# Patient Record
Sex: FEMALE | Race: WHITE
[De-identification: names, ages, dates, MRNs, and addresses within clinical notes are randomized per-mention and may not be internally consistent; named-entity substitution may affect disease eponyms.]

---

## 2019-08-15 ENCOUNTER — HOSPITAL ENCOUNTER (EMERGENCY)
Dept: HOSPITAL 75 - ER FS | Age: 34
Discharge: HOME | End: 2019-08-15
Payer: COMMERCIAL

## 2019-08-15 VITALS — WEIGHT: 150 LBS | BODY MASS INDEX: 25.61 KG/M2 | HEIGHT: 64 IN

## 2019-08-15 VITALS — DIASTOLIC BLOOD PRESSURE: 75 MMHG | SYSTOLIC BLOOD PRESSURE: 114 MMHG
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
## 2019-08-15 DIAGNOSIS — S90.32XA: Primary | ICD-10-CM

## 2019-08-15 PROCEDURE — 73630 X-RAY EXAM OF FOOT: CPT

## 2019-08-15 NOTE — ED LOWER EXTREMITY
General


Chief Complaint:  Lower Extremity


Stated Complaint:  LT FOOT INJ


Source:  patient


Exam Limitations:  no limitations





History of Present Illness


Date Seen by Provider:  Aug 15, 2019


Time Seen by Provider:  20:15


Initial Comments


The patient is a very pleasant 34-year-old female who presents for evaluation of

a left foot injury. She states that she was helping to move a camper and put her

foot down and ended up hitting the top of her foot on a piece of metal quite 

hard. She has some swelling to the top of the left foot and has significant pain

when trying to fully bear weight. She denies any ankle pain or any other 

complaints. She is alert and oriented 4, calm, and appears to be in no 

distress.


Location Injury Occurred:  home


Onset:  just prior to arrival


Severity:  moderate


Pain/Injury Location:  right foot


Method of Injury:  direct blow


Modifying Factors:  Improves With Rest





Allergies and Home Medications


Patient Home Medication List


Home Medication List Reviewed:  Yes





Review of Systems


Constitutional:  no symptoms reported


EENTM:  no symptoms reported


Respiratory:  no symptoms reported


Cardiovascular:  no symptoms reported


Gastrointestinal:  no symptoms reported


Genitourinary:  no symptoms reported


Musculoskeletal:  other (left foot pain, swelling on top of left foot, ttp over 

2nd, 3rd, and 4th metatarsals)


Skin:  no symptoms reported


Psychiatric/Neurological:  No Symptoms Reported





All Other Systems Reviewed


Negative Unless Noted:  Yes





Past Medical-Social-Family Hx


Past Med/Social Hx:  Reviewed Nursing Past Med/Soc Hx


Patient Social History


Recent Foreign Travel:  No


Contact w/Someone Who Travel:  No





Physical Exam


Vital Signs


Capillary Refill :


Height, Weight, BMI


Height: '"


Weight: lbs. oz. kg;  BMI


Method:


General Appearance:  WD/WN, no apparent distress


HEENT:  PERRL/EOMI, normal ENT inspection


Cardiovascular:  regular rate, rhythm, no edema


Respiratory:  normal breath sounds, no respiratory distress


Feet:  left foot bone tenderness (2nd, 3rd, 4th metatarsals), left foot limited 

range of motion, left foot swelling


Neurologic/Psychiatric:  CNs II-XII nml as tested, alert, normal mood/affect, 

oriented x 3


Skin:  normal color, warm/dry





Progress/Results/Core Measures


Results/Orders


My Orders





Orders - FUNMILAYO PLATA DO


Ice: Apply To Affected Area (8/15/19 20:20)


Foot 3 View Left (8/15/19 20:20)








Progress


Progress Note :  


Progress Note


@2027 - preliminary read of the left foot x-ray film is unremarkable. Patient 

given Ace wrap and crutches. Advised patient to follow up with her PCP and 

return to the emergency Department immediately for new or worsening symptoms. T

he patient is stable for discharge.





Departure


Impression





   Primary Impression:  


   Contusion of left foot


Disposition:  01 HOME, SELF-CARE


Condition:  Stable





Departure-Patient Inst.


Decision time for Depature:  20:35


Referrals:  


CLIFF LEI DO


Patient Instructions:  Contusion (DC)





Add. Discharge Instructions:  


He is a crutches provided. Follow-up with her doctor in the next 2-3 days. Apply

ice and keep extremity elevated at home. Return to the emergency department for 

new or worsening symptoms.











FUNMILAYO PLATA DO              Aug 15, 2019 20:30

## 2019-08-15 NOTE — DIAGNOSTIC IMAGING REPORT
INDICATION:  Pain status post injury 



COMPARISON: None.



FINDINGS: 3 views of the left foot demonstrate no acute fracture

or dislocation. There are no focal osseous lesions. There is mild

generalized soft tissue swelling. Joint spaces are well

maintained.  No radiopaque foreign bodies are seen.



IMPRESSION: Mild generalized soft tissue swelling of the left

foot, but no radiographic evidence of acute fracture or

dislocation.



Dictated by: 



  Dictated on workstation # [id number]

## 2020-05-02 ENCOUNTER — HOSPITAL ENCOUNTER (EMERGENCY)
Dept: HOSPITAL 75 - ER FS | Age: 35
Discharge: HOME | End: 2020-05-02
Payer: COMMERCIAL

## 2020-05-02 VITALS — DIASTOLIC BLOOD PRESSURE: 66 MMHG | SYSTOLIC BLOOD PRESSURE: 116 MMHG

## 2020-05-02 VITALS — WEIGHT: 145.51 LBS | BODY MASS INDEX: 24.84 KG/M2 | HEIGHT: 64.17 IN

## 2020-05-02 DIAGNOSIS — S90.32XA: Primary | ICD-10-CM

## 2020-05-02 DIAGNOSIS — Z88.2: ICD-10-CM
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
## 2020-05-02 DIAGNOSIS — Z23: ICD-10-CM

## 2020-05-02 PROCEDURE — 90715 TDAP VACCINE 7 YRS/> IM: CPT

## 2020-05-02 PROCEDURE — 73630 X-RAY EXAM OF FOOT: CPT

## 2020-05-02 NOTE — ED GENERAL
General


Stated Complaint:  LT FOOT INJ





History of Present Illness


Date Seen by Provider:  May 2, 2020


Time Seen by Provider:  14:29


Initial Comments


Patient is a 33 y/o female who comes to the ER today c/o left foot injury.  

Patient was unhitching a pop-up camper with assistance of her .  They 

accidentally lost control and dropped the tongue of the trailer.  It landed over

the dorsal aspect of her left foot causing a crush injury.  Incident just 

happened prior to presentation.  Minor abrasion to dorsal foot.  Uncertain when 

her last tetanus imm was.





Allergies and Home Medications


Allergies


Coded Allergies:  


     Sulfa (Sulfonamide Antibiotics) (Verified  Allergy, Unknown, 5/2/20)





Home Medications


Hydrocodone/Acetaminophen 1 Each Tablet, 1-2 EACH PO Q6H


   Prescribed by: JOSE ANDRE on 5/2/20 1512


Ibuprofen 800 Mg Tablet, 800 MG PO Q8H PRN for PAIN


   Prescribed by: JOSE ANDRE on 5/2/20 1512





Patient Home Medication List


Home Medication List Reviewed:  Yes





Review of Systems


Review of Systems


Constitutional:  no symptoms reported


EENTM:  no symptoms reported


Respiratory:  no symptoms reported


Gastrointestinal:  no symptoms reported


Pregnant:  No


Musculoskeletal:  see HPI





Physical Exam


Vital Signs





Vital Signs - First Documented








 5/2/20





 14:33


 


Temp 36.4


 


Pulse 100


 


Resp 18


 


B/P (MAP) 116/66 (83)


 


Pulse Ox 98





Capillary Refill :


Height, Weight, BMI


Height: '"


Weight: lbs. oz. kg;  BMI


Method:


General Appearance:  No Apparent Distress, WD/WN


HEENT:  PERRL/EOMI


Neck:  Full Range of Motion


Respiratory:  No Respiratory Distress


Cardiovascular:  Normal Peripheral Pulses


Extremity:  Other (Obvious deformity and swelling over the dorsal aspect of the 

left foot.  Sensation to light touch intact over all dermatomes.  2+ dp pulses. 

Distal capillary refill < 2 seconds.)


Neurologic/Psychiatric:  Alert, Oriented x3





Progress/Results/Core Measures


Suspected Sepsis


SIRS


Temperature: 


Pulse:  


Respiratory Rate: 


 


Blood Pressure  / 


Mean:





Results/Orders


My Orders





Orders - JOSE ANDRE DO


Foot 3 View Left (5/2/20 14:31)


Dipht,Pertuss(Acell),Tet Adult (Boostrix (5/2/20 14:45)


Hydrocodone/Apap 7.5/325 Tab (Lortab 7. (5/2/20 15:00)


Ibuprofen  Tablet (Motrin  Tablet) (5/2/20 15:00)


Ice: Apply To Affected Area (5/2/20 15:07)


Ace Bandage (5/2/20 15:07)


Boot Heel Lift Suspension (5/2/20 15:07)





Medications Given in ED





Current Medications








 Medications  Dose


 Ordered  Sig/Aliyah


 Route  Start Time


 Stop Time Status Last Admin


Dose Admin


 


 Acetaminophen/


 Hydrocodone Bitart  1 ea  ONCE  ONCE


 PO  5/2/20 15:00


 5/2/20 15:01 DC 5/2/20 14:51


1 EA


 


 Diphtheria/


 Tetanus/Acell


 Pertussis  0.5 ml  ONCE ONCE


 IM  5/2/20 14:45


 5/2/20 14:46 DC 5/2/20 14:43


0.5 ML


 


 Ibuprofen  800 mg  ONCE  ONCE


 PO  5/2/20 15:00


 5/2/20 15:01 DC 5/2/20 14:51


800 MG








Vital Signs/I&O











 5/2/20





 14:33


 


Temp 36.4


 


Pulse 100


 


Resp 18


 


B/P (MAP) 116/66 (83)


 


Pulse Ox 98





Capillary Refill :


Progress Note :  


   Time:  14:41


Progress Note


Patient is seen and examined.  XR ordered.  Boostrix ordered.





15:15:  XR returned with no acute fracture.  Ace wrap is applied and CAM boot 

walker.  Patient given Rx for motrin/norco to use at home.  Discharged and all 

of her questions are answered.  Will return if sx are worsening or not 

improving.  Otherwise will f/u with PCP as needed.





Departure


Impression





   Primary Impression:  


   Contusion of foot, left


Disposition:  01 HOME, SELF-CARE


Condition:  Improved





Departure-Patient Inst.


Scripts


Hydrocodone/Acetaminophen (Hydrocodone-Acetamin 5-325 mg) 1 Each Tablet


1-2 EACH PO Q6H for Severe Pain, #16 TAB


   Prov: JOSE ANDRE DO         5/2/20 


Ibuprofen (Ibuprofen) 800 Mg Tablet


800 MG PO Q8H PRN for PAIN, #30 TAB 0 Refills


   Prov: JOSE ANDRE DO         5/2/20











JOSE ANDRE DO              May 2, 2020 14:29

## 2020-05-02 NOTE — DIAGNOSTIC IMAGING REPORT
INDICATION: Foot pain. 



EXAMINATION: Left foot kishan 2:39 p.m. Three views were obtained.



FINDINGS: The previous left foot exam of 08/15/2019 failed to

show any sign of an acute bony abnormality.



On this study, there is still no fracture or dislocation

identified. The lateral view does show that there is considerable

soft tissue edema along the dorsum of the forefoot. There is no

radiopaque foreign body identified in this area, however.



The Lisfranc joint seems well maintained. There is still no

evidence for a calcaneal spur.



IMPRESSION: There is soft tissue edema along the dorsum of the

forefoot but there is no evidence for a fracture or for a

radiopaque foreign body.



Dictated by: 



  Dictated on workstation # [id number]

## 2020-05-02 NOTE — XMS REPORT
Continuity of Care Document

                             Created on: 2020



NEGRITO GAO

External Reference #: [id number]

: 1985

Sex: Female



Demographics





                          Address                   [address] [place]  [address]

 

                          Home Phone                [phone] x

 

                          Preferred Language        Unknown

 

                          Marital Status            Unknown

 

                          Jain Affiliation     Unknown

 

                          Race                      Unknown

 

                          Ethnic Group              Unknown





Author





                          Organization              Unknown

 

                          Address                   Unknown

 

                          Phone                     Unavailable



              



Allergies

      



There is no data.                  



Medications

      



There is no data.                  



Problems

      



             Date Dx Coded           Attending           Type           Code    

       

Diagnosis                               Diagnosed By        

 

                08/15/2019           SHERLYN MELLO DO           Ot            

  S90.32XA         

                          CONTUSION OF LEFT FOOT, INITIAL ENCOUNTE              

      

 

                08/15/2019           SHERLYN MELLO DO           Ot            

  S99.922A         

                          UNSPECIFIED INJURY OF LEFT FOOT, INITIAL              

      

 

                08/15/2019           SHERLYN MELLO DO           Ot            

  W22.8XXA         

                          STRIKING AGAINST OR STRUCK BY OTHER OBJE              

      

 

                2019           SHERLYN MELLO DO           Ot            

  S90.32XA         

                          CONTUSION OF LEFT FOOT, INITIAL ENCOUNTE              

      

 

                2019           SHERLYN MELLO DO           Ot            

  S99.922A         

                          UNSPECIFIED INJURY OF LEFT FOOT, INITIAL              

      

 

                2019           SHERLYN MELLO DO           Ot            

  W22.8XXA         

                          STRIKING AGAINST OR STRUCK BY OTHER OBJE              

      

 

                2019           SHERLYN MELLO DO           Ot            

  S90.32XA         

                          CONTUSION OF LEFT FOOT, INITIAL ENCOUNTE              

      

 

                2019           SHERLYN MELLO DO           Ot            

  S99.922A         

                          UNSPECIFIED INJURY OF LEFT FOOT, INITIAL              

      

 

                2019           SHERLYN MELLO DO           Ot            

  W22.8XXA         

                          STRIKING AGAINST OR STRUCK BY OTHER OBJE              

      



                                  



Procedures

      



There is no data.                  



Results

      



There is no data.              



Encounters

      



                ACCT No.           Visit Date/Time           Discharge          

 Status         

             Pt. Type           Provider           Facility           Loc./Unit 

          

Complaint        

 

                    [id number]           08/15/2019 20:15:00           08/15/2

019 20:52:00        

                [facility]             Emergency           SHERLYN MELLO DO          

 [facility]           ER FS                     LT FOOT INJ

## 2020-05-21 ENCOUNTER — HOSPITAL ENCOUNTER (OUTPATIENT)
Dept: HOSPITAL 75 - RAD FS | Age: 35
End: 2020-05-21
Attending: NURSE PRACTITIONER
Payer: COMMERCIAL

## 2020-05-21 DIAGNOSIS — S99.922D: Primary | ICD-10-CM

## 2020-05-21 PROCEDURE — 73630 X-RAY EXAM OF FOOT: CPT

## 2020-05-21 NOTE — DIAGNOSTIC IMAGING REPORT
INDICATION: Left foot injury with pain and swelling.



TIME OF EXAM: 3:31 p.m.



COMPARISON: Correlation is made with prior radiograph from

05/02/2020.



FINDINGS: The metatarsals appear to be intact. The phalanges are

intact. Midfoot and hindfoot are unremarkable. No fractures are

seen. Soft tissues are unremarkable.



IMPRESSION: No acute abnormality is detected. 



Dictated by: 



  Dictated on workstation # [id number]

## 2021-02-11 ENCOUNTER — HOSPITAL ENCOUNTER (EMERGENCY)
Dept: HOSPITAL 75 - ER | Age: 36
Discharge: HOME | End: 2021-02-11
Payer: COMMERCIAL

## 2021-02-11 VITALS — WEIGHT: 143.3 LBS | HEIGHT: 63.78 IN | BODY MASS INDEX: 24.77 KG/M2

## 2021-02-11 VITALS — SYSTOLIC BLOOD PRESSURE: 121 MMHG | DIASTOLIC BLOOD PRESSURE: 79 MMHG

## 2021-02-11 DIAGNOSIS — R07.9: Primary | ICD-10-CM

## 2021-02-11 DIAGNOSIS — Z88.2: ICD-10-CM

## 2021-02-11 LAB
ALBUMIN SERPL-MCNC: 4.3 GM/DL (ref 3.2–4.5)
ALP SERPL-CCNC: 62 U/L (ref 40–136)
ALT SERPL-CCNC: 15 U/L (ref 0–55)
APTT BLD: 29 SEC (ref 24–35)
BASOPHILS # BLD AUTO: 0 10^3/UL (ref 0–0.1)
BASOPHILS NFR BLD AUTO: 1 % (ref 0–10)
BILIRUB SERPL-MCNC: 0.6 MG/DL (ref 0.1–1)
BUN/CREAT SERPL: 14
CALCIUM SERPL-MCNC: 8.9 MG/DL (ref 8.5–10.1)
CHLORIDE SERPL-SCNC: 107 MMOL/L (ref 98–107)
CO2 SERPL-SCNC: 22 MMOL/L (ref 21–32)
CREAT SERPL-MCNC: 0.86 MG/DL (ref 0.6–1.3)
EOSINOPHIL # BLD AUTO: 0.1 10^3/UL (ref 0–0.3)
EOSINOPHIL NFR BLD AUTO: 1 % (ref 0–10)
GFR SERPLBLD BASED ON 1.73 SQ M-ARVRAT: > 60 ML/MIN
GLUCOSE SERPL-MCNC: 106 MG/DL (ref 70–105)
HCT VFR BLD CALC: 38 % (ref 35–52)
HGB BLD-MCNC: 12.4 G/DL (ref 11.5–16)
INR PPP: 1 (ref 0.8–1.4)
LYMPHOCYTES # BLD AUTO: 1 10^3/UL (ref 1–4)
LYMPHOCYTES NFR BLD AUTO: 14 % (ref 12–44)
MAGNESIUM SERPL-MCNC: 2.2 MG/DL (ref 1.6–2.4)
MANUAL DIFFERENTIAL PERFORMED BLD QL: NO
MCH RBC QN AUTO: 28 PG (ref 25–34)
MCHC RBC AUTO-ENTMCNC: 33 G/DL (ref 32–36)
MCV RBC AUTO: 85 FL (ref 80–99)
MONOCYTES # BLD AUTO: 0.7 10^3/UL (ref 0–1)
MONOCYTES NFR BLD AUTO: 10 % (ref 0–12)
NEUTROPHILS # BLD AUTO: 5.7 10^3/UL (ref 1.8–7.8)
NEUTROPHILS NFR BLD AUTO: 75 % (ref 42–75)
PLATELET # BLD: 344 10^3/UL (ref 130–400)
PMV BLD AUTO: 10.2 FL (ref 9–12.2)
POTASSIUM SERPL-SCNC: 3.7 MMOL/L (ref 3.6–5)
PROT SERPL-MCNC: 7.6 GM/DL (ref 6.4–8.2)
PROTHROMBIN TIME: 13.8 SEC (ref 12.2–14.7)
SODIUM SERPL-SCNC: 138 MMOL/L (ref 135–145)
WBC # BLD AUTO: 7.6 10^3/UL (ref 4.3–11)

## 2021-02-11 PROCEDURE — 93005 ELECTROCARDIOGRAM TRACING: CPT

## 2021-02-11 PROCEDURE — 85730 THROMBOPLASTIN TIME PARTIAL: CPT

## 2021-02-11 PROCEDURE — 36415 COLL VENOUS BLD VENIPUNCTURE: CPT

## 2021-02-11 PROCEDURE — 84703 CHORIONIC GONADOTROPIN ASSAY: CPT

## 2021-02-11 PROCEDURE — 93041 RHYTHM ECG TRACING: CPT

## 2021-02-11 PROCEDURE — 71045 X-RAY EXAM CHEST 1 VIEW: CPT

## 2021-02-11 PROCEDURE — 83735 ASSAY OF MAGNESIUM: CPT

## 2021-02-11 PROCEDURE — 83880 ASSAY OF NATRIURETIC PEPTIDE: CPT

## 2021-02-11 PROCEDURE — 85379 FIBRIN DEGRADATION QUANT: CPT

## 2021-02-11 PROCEDURE — 85610 PROTHROMBIN TIME: CPT

## 2021-02-11 PROCEDURE — 83874 ASSAY OF MYOGLOBIN: CPT

## 2021-02-11 PROCEDURE — 84484 ASSAY OF TROPONIN QUANT: CPT

## 2021-02-11 PROCEDURE — 80053 COMPREHEN METABOLIC PANEL: CPT

## 2021-02-11 PROCEDURE — 85025 COMPLETE CBC W/AUTO DIFF WBC: CPT

## 2021-02-11 NOTE — DIAGNOSTIC IMAGING REPORT
INDICATION:  Left breast pain starting one day earlier, now

radiating into the chest and back.  



TECHNIQUE:  Single view chest 1:19 PM.



CORRELATION STUDY:  None



FINDINGS: 

The heart size, mediastinal configuration and pulmonary

vascularity are within normal limits.  

The lungs are clear with no consolidating infiltrate. There is no

significant effusion or pneumothorax. 



IMPRESSION: 

1. Negative for acute abnormality of the chest.



Dictated by: 



  Dictated on workstation # [id number]

## 2021-02-11 NOTE — ED CHEST PAIN
General


Chief Complaint:  Chest Pain


Stated Complaint:  CP


Nursing Triage Note:  


ARRIVED VIA AMB TO ROOM 08. STATES SHE SAW HER DR FOR LEFT BREAST PAIN YESTERDAY




BUT TODAY THE PAIN IS RADIATING THRUOUT THE LEFT CHEST AND BACK.


Nursing Sepsis Screen:  No Definite Risk


Source:  patient


Exam Limitations:  no limitations





History of Present Illness


Date Seen by Provider:  Feb 11, 2021


Time Seen by Provider:  12:46


Initial Comments


To ER with reports of left breast tenderness for about a week.  She has a 

history of fibrous breasts.  This pain then spread up to the left arm.  The pain

is worsened by movement of the left arm and worsened by deep breathing.  No 

fevers chills or injury.  She called her primary care provider up in [place] 

who recommended she go to the emergency room.  They are going to schedule her 

for a mammogram.


Timing/Duration:  constant


Severity/Quality:  moderate


Location:  central


Radiation:  no radiation


Activities at Onset:  none


ASA po PTA:  No


NTG SL PTA:  No


Associated Symptoms:  No shortness of breath





Allergies and Home Medications


Allergies


Coded Allergies:  


     Sulfa (Sulfonamide Antibiotics) (Verified  Allergy, Unknown, 5/2/20)





Home Medications


Hydrocodone/Acetaminophen 1 Each Tablet, 1-2 EACH PO Q6H


   Prescribed by: JOSE ANDRE on 5/2/20 1512


Ibuprofen 800 Mg Tablet, 800 MG PO Q8H PRN for PAIN


   Prescribed by: JOSE ANDRE on 5/2/20 1512





Patient Home Medication List


Home Medication List Reviewed:  Yes





Review of Systems


Review of Systems


Constitutional:  see HPI


EENTM:  No Symptoms Reported


Respiratory:  No Symptoms Reported


Cardiovascular:  No Symptoms Reported


Gastrointestinal:  See HPI


Genitourinary:  No Symptoms Reported


Musculoskeletal:  no symptoms reported


Skin:  no symptoms reported


Psychiatric/Neurological:  No Symptoms Reported


Endocrine:  No Symptoms Reported


Hematologic/Lymphatic:  No Symptoms Reported





Past Medical-Social-Family Hx


Patient Social History


Alcohol Use:  Denies Use


Smoking Status:  Never a Smoker


2nd Hand Smoke Exposure:  No


Recent Infectious Disease Expo:  No


Recent Hopitalizations:  No





Seasonal Allergies


Seasonal Allergies:  No





Past Medical History


Surgeries:  Yes (EGD with Stomach Dilation)


Respiratory:  No


Cardiac:  No


Neurological:  No


Genitourinary:  No


Gastrointestinal:  No


Musculoskeletal:  No


Endocrine:  No


HEENT:  No


Cancer:  No


Psychosocial:  No


Integumentary:  No





Physical Exam


Vital Signs





Vital Signs - First Documented








 2/11/21





 12:49


 


Temp 37.0


 


Pulse 117


 


Resp 16


 


B/P (MAP) 132/94 (107)


 


Pulse Ox 99


 


O2 Delivery Room Air





Capillary Refill : Less Than 3 Seconds


Height, Weight, BMI


Height: 5'4.00"


Weight: 150lbs. 0oz. 68.421880in; 24.00 BMI


Method:Stated


General Appearance:  No Apparent Distress, WD/WN


Neck:  Full Range of Motion, Normal Inspection


Respiratory:  No Accessory Muscle Use, No Respiratory Distress


Gastrointestinal:  Normal Bowel Sounds, Non Tender, Soft


Extremity:  Normal Capillary Refill, Normal Inspection


Neurologic/Psychiatric:  Alert, Oriented x3


Skin:  Normal Color, Warm/Dry


Other comments


The left breast is normal in appearance without swelling, no erythema or skin 

changes.  It is tender to palpation.





Progress/Results/Core Measures


Results/Orders


Lab Results





Laboratory Tests








Test


 2/11/21


13:11 Range/Units


 


 


White Blood Count


 7.6 


 4.3-11.0


10^3/uL


 


Red Blood Count


 4.42 


 3.80-5.11


10^6/uL


 


Hemoglobin 12.4  11.5-16.0  g/dL


 


Hematocrit 38  35-52  %


 


Mean Corpuscular Volume 85  80-99  fL


 


Mean Corpuscular Hemoglobin 28  25-34  pg


 


Mean Corpuscular Hemoglobin


Concent 33 


 32-36  g/dL





 


Red Cell Distribution Width 13.2  10.0-14.5  %


 


Platelet Count


 344 


 130-400


10^3/uL


 


Mean Platelet Volume 10.2  9.0-12.2  fL


 


Immature Granulocyte % (Auto) 0   %


 


Neutrophils (%) (Auto) 75  42-75  %


 


Lymphocytes (%) (Auto) 14  12-44  %


 


Monocytes (%) (Auto) 10  0-12  %


 


Eosinophils (%) (Auto) 1  0-10  %


 


Basophils (%) (Auto) 1  0-10  %


 


Neutrophils # (Auto)


 5.7 


 1.8-7.8


10^3/uL


 


Lymphocytes # (Auto)


 1.0 


 1.0-4.0


10^3/uL


 


Monocytes # (Auto)


 0.7 


 0.0-1.0


10^3/uL


 


Eosinophils # (Auto)


 0.1 


 0.0-0.3


10^3/uL


 


Basophils # (Auto)


 0.0 


 0.0-0.1


10^3/uL


 


Immature Granulocyte # (Auto)


 0.0 


 0.0-0.1


10^3/uL


 


Prothrombin Time 13.8  12.2-14.7  SEC


 


INR Comment 1.0  0.8-1.4  


 


Activated Partial


Thromboplast Time 29 


 24-35  SEC





 


D-Dimer


 0.35 


 0.00-0.49


UG/ML


 


Sodium Level 138  135-145  MMOL/L


 


Potassium Level 3.7  3.6-5.0  MMOL/L


 


Chloride Level 107    MMOL/L


 


Carbon Dioxide Level 22  21-32  MMOL/L


 


Anion Gap 9  5-14  MMOL/L


 


Blood Urea Nitrogen 12  7-18  MG/DL


 


Creatinine


 0.86 


 0.60-1.30


MG/DL


 


Estimat Glomerular Filtration


Rate > 60 


  





 


BUN/Creatinine Ratio 14   


 


Glucose Level 106 H   MG/DL


 


Calcium Level 8.9  8.5-10.1  MG/DL


 


Corrected Calcium 8.7  8.5-10.1  MG/DL


 


Magnesium Level 2.2  1.6-2.4  MG/DL


 


Total Bilirubin 0.6  0.1-1.0  MG/DL


 


Aspartate Amino Transf


(AST/SGOT) 19 


 5-34  U/L





 


Alanine Aminotransferase


(ALT/SGPT) 15 


 0-55  U/L





 


Alkaline Phosphatase 62    U/L


 


Myoglobin


 15.3 


 10.0-92.0


NG/ML


 


Troponin I < 0.028  <0.028  NG/ML


 


B-Type Natriuretic Peptide < 10.0  <100.0  PG/ML


 


Total Protein 7.6  6.4-8.2  GM/DL


 


Albumin 4.3  3.2-4.5  GM/DL


 


Serum Pregnancy Test,


Qualitative NEGATIVE 


 NEGATIVE  











My Orders





Orders - LINDA BOWERS


Cbc With Automated Diff (2/11/21 12:51)


Magnesium (2/11/21 12:51)


Chest 1 View, Ap/Pa Only (2/11/21 12:51)


Ekg Tracing (2/11/21 12:51)


Comprehensive Metabolic Panel (2/11/21 12:51)


Myoglobin Serum (2/11/21 12:51)


Protime With Inr (2/11/21 12:51)


Partial Thromboplastin Time (2/11/21 12:51)


O2 (2/11/21 12:51)


Monitor-Rhythm Ecg Trace Only (2/11/21 12:51)


Lipid Panel (2/12/21 06:00)


Ed Iv/Invasive Line Start (2/11/21 12:51)


BNP (2/11/21 12:51)


Ketorolac Injection (Toradol Injection) (2/11/21 13:00)


Troponin I (2/11/21 13:11)


Fibrin Degradation Products (2/11/21 13:45)


Hcg,Qualitative Serum (2/11/21 13:49)





Medications Given in ED





Current Medications








 Medications  Dose


 Ordered  Sig/Aliyah


 Route  Start Time


 Stop Time Status Last Admin


Dose Admin


 


 Ketorolac


 Tromethamine  15 mg  ONCE  ONCE


 IVP  2/11/21 13:00


 2/11/21 13:01 DC 2/11/21 13:10


15 MG








Vital Signs/I&O











 2/11/21





 12:49


 


Temp 37.0


 


Pulse 117


 


Resp 16


 


B/P (MAP) 132/94 (107)


 


Pulse Ox 99


 


O2 Delivery Room Air














Blood Pressure Mean:                    107











Departure


Impression





   Primary Impression:  


   Chest pain


Disposition:  01 HOME, SELF-CARE


Condition:  Stable





Departure-Patient Inst.


Decision time for Depature:  14:10


Referrals:  


NO,LOCAL PHYSICIAN (PCP/Family)


Primary Care Physician


Patient Instructions:  Chest Pain





Add. Discharge Instructions:  


1.  Follow-up with your doctor next week to evaluate with mammogram.  Return to 

ER for any concerns.





All discharge instructions reviewed with patient and/or family. Voiced 

understanding.











LINDA BOWERS             Feb 11, 2021 13:49